# Patient Record
Sex: MALE | Race: WHITE
[De-identification: names, ages, dates, MRNs, and addresses within clinical notes are randomized per-mention and may not be internally consistent; named-entity substitution may affect disease eponyms.]

---

## 2019-02-15 ENCOUNTER — HOSPITAL ENCOUNTER (OUTPATIENT)
Dept: HOSPITAL 11 - JP.SDS | Age: 64
Discharge: HOME | End: 2019-02-15
Attending: SURGERY
Payer: MEDICAID

## 2019-02-15 DIAGNOSIS — K64.9: ICD-10-CM

## 2019-02-15 DIAGNOSIS — F17.210: ICD-10-CM

## 2019-02-15 DIAGNOSIS — K29.71: Primary | ICD-10-CM

## 2019-02-15 DIAGNOSIS — K63.89: ICD-10-CM

## 2019-02-15 DIAGNOSIS — K57.31: ICD-10-CM

## 2019-02-15 DIAGNOSIS — K21.0: ICD-10-CM

## 2019-02-15 DIAGNOSIS — R19.4: ICD-10-CM

## 2019-02-15 PROCEDURE — 87081 CULTURE SCREEN ONLY: CPT

## 2019-02-15 PROCEDURE — 43239 EGD BIOPSY SINGLE/MULTIPLE: CPT

## 2019-02-15 PROCEDURE — 45378 DIAGNOSTIC COLONOSCOPY: CPT

## 2019-02-15 PROCEDURE — 88305 TISSUE EXAM BY PATHOLOGIST: CPT

## 2019-02-15 NOTE — OR
DATE OF PROCEDURE:  02/15/2019

 

PREOPERATIVE DIAGNOSES:

1. Abdominal pain.

2. Change in bowel habits.

3. Blood in stool.

 

POSTOPERATIVE DIAGNOSES:

1. Abdominal pain.

2. Change in bowel habits.

3. Blood in stool.

4. Gastroesophageal reflux disease, mild gastritis.

5. Diverticulosis.

6. Melanosis coli.

7. Hemorrhoids.

 

PROCEDURE:

1. Esophagogastroduodenoscopy with antral biopsies for CLOtest, sent for 
pathology to look

    for Helicobacter pylori, biopsy of gastroesophageal junction.

2. Colonoscopy to the cecum.



SURGEON:  Dustin Rojas MD



ANESTHESIA:  IV anesthesia with monitored anesthesia care.

 

INDICATION:  This is a 63-year-old white male who is referred for upper and 
lower endoscopies.

Indications for these include abdominal pain, blood in stool, and change in 
bowel habits.  I

counseled him for upper and lower endoscopy with possible biopsy including 
risks and

alternatives, and he gave his informed consent to proceed.

 

DESCRIPTION OF PROCEDURE:  The patient was placed in the left lateral decubitus 
position.

IV anesthesia was administered by the Anesthesia Service.  Time-out was held.  
The flexible

video Olympus upper endoscope was passed through his mouth, down his esophagus 
and into 

his stomach.  The scope was easily passed through the pylorus into the duodenum 
reaching 

its third portion.  The scope was then slowly withdrawn examining the mucosa 
throughout.  The

duodenal mucosa appeared unremarkable.  The scope was brought back through the 
pylorus 

into the antrum.  There was a fine erythema here suggestive of mild gastritis.  
The scope was

retroflexed.  The proximal stomach appeared unremarkable.  The scope was 
straightened.  

We obtained antral biopsies for CLOtest and for pathology to look for 
Helicobacter pylori.  

The scope was then brought up through the GE junction.  This was abnormal in 
that the Z-line 

was not straight with fingers of gastric mucosa going proximally up into the 
esophagus.  We

obtained multiple totalling at least 6 biopsies of the gastroesophageal 
junction.  The scope

was then brought proximally up through the remainder of the esophagus which 
otherwise

appeared unremarkable and it was removed.

 

Next, a rectal exam was performed, which was unremarkable.  The flexible video 
Olympus

colonoscope was introduced through his anus, up his rectum, and out his colon 
all the way 

to the cecum.  En route, we saw multiple left-sided diverticula. There was no 
bleeding or 

inflammation associated with any of them.  Once the cecum was reached, the 
scope was 

slowly withdrawn examining the mucosa throughout.  No additional mucosal 
abnormalities 

were noted.  The scope was retroflexed in the rectum with the distal rectum 
showing some 

fairly prominent hemorrhoids, which is probably etiology of his blood in stool.
  The scope 

was straightened and removed.  He tolerated the procedure well.

 

 

 

 

Dustin Rojas MD

DD:  02/15/2019 10:25:59

DT:  02/15/2019 11:08:17

Job #:  [id number]

MTDD

## 2020-06-07 NOTE — EDM.PDOC
ED HPI GENERAL MEDICAL PROBLEM





- General


Chief Complaint: Abdominal Pain


Stated Complaint: REACTION TO MEDS FROM SURGERY


Time Seen by Provider: 06/07/20 10:53


Source of Information: Reports: Patient


History Limitations: Reports: No Limitations





- History of Present Illness


INITIAL COMMENTS - FREE TEXT/NARRATIVE: 





63 yo male presents with ABD pain, bloating, nausea and vomiting.  4 days ago 

pt had EGD, he had a normal bowel movement since then he has had increasing ABD 

pain with bloating and belching.  He has not been able to tolerate oral intake 

for 48 hours.  ABD pain is cramping in nature.  He denies hx of ABD surgery or 

past bowel obstructions


  ** abdomen and kindney pain


Pain Score (Numeric/FACES): 10





- Related Data


 Allergies











Allergy/AdvReac Type Severity Reaction Status Date / Time


 


No Known Allergies Allergy   Verified 06/07/20 10:54











Home Meds: 


 Home Meds





Aspirin [Adult Aspirin] 81 mg PO DAILY 02/14/19 [History]


Multivitamin [Multi-Vitamin Daily] 1 tab PO DAILY 02/14/19 [History]


Saw Palmetto 160 mg PO DAILY 06/04/20 [History]











Past Medical History


HEENT History: Reports: Cataract, Retinal Detachment, Other (See Below)


Other HEENT History: partial detached retina, ringing in ears


Gastrointestinal History: Reports: Chronic Constipation, Colon Polyp, 

Hemorrhoids, Other (See Below)


Other Gastrointestinal History: hx of Barretts


Musculoskeletal History: Reports: Arthritis, Back Pain, Chronic, Fracture, 

Other (See Below)


Other Musculoskeletal History: right foot fractured


Neurological History: Reports: Concussion





- Infectious Disease History


Infectious Disease History: Reports: Chicken Pox, Measles





- Past Surgical History


HEENT Surgical History: Reports: Cataract Surgery


GI Surgical History: Reports: Colonoscopy, EGD


Neurological Surgical History: Reports: None


Musculoskeletal Surgical History: Reports: Other (See Below)


Other Musculoskeletal Surgeries/Procedures:: right foot surgery





Social & Family History





- Tobacco Use


Smoking Status *Q: Current Every Day Smoker


Years of Tobacco use: 35


Packs/Tins Daily: 0.2





- Caffeine Use


Caffeine Use: Reports: Coffee





ED ROS GENERAL





- Review of Systems


Review Of Systems: See Below


Constitutional: Denies: Fever, Chills, Fatigue


Respiratory: Denies: Shortness of Breath, Wheezing


Cardiovascular: Denies: Chest Pain


GI/Abdominal: Reports: Abdominal Pain


: Denies: Dysuria





ED EXAM, GI/ABD





- Physical Exam


Exam: See Below


Exam Limited By: No Limitations


General Appearance: Alert, WD/WN, Moderate Distress


Head: Atraumatic, Normocephalic


Neck: Normal Inspection, Supple, Non-Tender, Full Range of Motion.  No: 

Lymphadenopathy (R), Lymphadenopathy (L)


Respiratory/Chest: No Respiratory Distress, Lungs Clear, Normal Breath Sounds.  

No: Crackles, Rhonchi, Wheezing


Cardiovascular: Tachycardia


GI/Abdominal Exam: Distended, Tender, Abnormal Bowel Sounds


Neurological: Alert, Oriented


Psychiatric: Normal Affect, Normal Mood


Skin Exam: Warm, Dry, Intact





Course





- Vital Signs


Last Recorded V/S: 


 Last Vital Signs











Temp  36.7 C   06/07/20 10:58


 


Pulse  103 H  06/07/20 10:58


 


Resp  16   06/07/20 10:58


 


BP  177/103 H  06/07/20 10:58


 


Pulse Ox  95   06/07/20 10:58














- Orders/Labs/Meds


Orders: 


 Active Orders 24 hr











 Category Date Time Status


 


 Sodium Chloride 0.9% [Normal Saline] 1,000 ml Med  06/07/20 11:15 Active





 IV ASDIRECTED   


 


 Sodium Chloride 0.9% [Normal Saline] 1,000 ml Med  06/07/20 14:00 Active





 IV ASDIRECTED   








 Medication Orders





Sodium Chloride (Normal Saline)  1,000 mls @ 500 mls/hr IV ASDIRECTED RUBÉN


   Last Admin: 06/07/20 11:24  Dose: 500 mls/hr


Sodium Chloride (Normal Saline)  1,000 mls @ 999 mls/hr IV ASDIRECTED RUBÉN








Labs: 


 Laboratory Tests











  06/07/20 06/07/20 06/07/20 Range/Units





  11:21 11:21 12:48 


 


WBC   23.3 H   (4.5-11.0)  K/uL


 


RBC   5.34   (4.30-5.90)  M/uL


 


Hgb   15.9 H   (12.0-15.0)  g/dL


 


Hct   47.5   (40.0-54.0)  %


 


MCV   89   (80-98)  fL


 


MCH   30   (27-31)  pg


 


MCHC   34   (32-36)  %


 


Plt Count   279   (150-400)  K/uL


 


Neut % (Auto)   84 H   (36-66)  %


 


Lymph % (Auto)   7 L   (24-44)  %


 


Mono % (Auto)   9 H   (2-6)  %


 


Eos % (Auto)   0 L   (2-4)  %


 


Baso % (Auto)   0   (0-1)  %


 


Sodium  133 L    (140-148)  mmol/L


 


Potassium  4.0    (3.6-5.2)  mmol/L


 


Chloride  98 L    (100-108)  mmol/L


 


Carbon Dioxide  23    (21-32)  mmol/L


 


Anion Gap  16.0 H    (5.0-14.0)  mmol/L


 


BUN  57 H    (7-18)  mg/dL


 


Creatinine  3.4 H    (0.8-1.3)  mg/dL


 


Est Cr Clr Drug Dosing  22.24    mL/min


 


Estimated GFR (MDRD)  18 L    (>60)  


 


Glucose  155 H    ()  mg/dL


 


Calcium  9.2    (8.5-10.1)  mg/dL


 


Total Bilirubin  0.9    (0.2-1.0)  mg/dL


 


AST  37    (15-37)  U/L


 


ALT  31    (12-78)  U/L


 


Alkaline Phosphatase  93    ()  U/L


 


Total Protein  8.2    (6.4-8.2)  g/dL


 


Albumin  3.9    (3.4-5.0)  g/dL


 


Globulin  4.3 H    (2.3-3.5)  g/dL


 


Albumin/Globulin Ratio  0.9 L    (1.2-2.2)  


 


Urine Color    Yellow  (YELLOW)  


 


Urine Appearance    Slightly cloudy A  (CLEAR)  


 


Urine pH    5.5  (5.0-8.0)  


 


Ur Specific Gravity    1.025  (1.008-1.030)  


 


Urine Protein    Negative  (NEGATIVE)  mg/dL


 


Urine Glucose (UA)    Negative  (NEGATIVE)  mg/dL


 


Urine Ketones    Negative  (NEGATIVE)  mg/dL


 


Urine Occult Blood    Large H  (NEGATIVE)  


 


Urine Nitrite    Negative  (NEGATIVE)  


 


Urine Bilirubin    Negative  (NEGATIVE)  


 


Urine Urobilinogen    0.2  (0.2-1.0)  EU/dL


 


Ur Leukocyte Esterase    Negative  (NEGATIVE)  


 


Urine RBC    20-30 H  (0-5)  


 


Urine WBC    0-5  (0-5)  


 


Ur Epithelial Cells    Few  


 


Amorphous Sediment    Not seen  


 


Urine Bacteria    Few  


 


Urine Mucus    Not seen  











Meds: 


Medications











Generic Name Dose Route Start Last Admin





  Trade Name Freq  PRN Reason Stop Dose Admin


 


Sodium Chloride  1,000 mls @ 500 mls/hr  06/07/20 11:15  06/07/20 11:24





  Normal Saline  IV   500 mls/hr





  ASDIRECTED RUBÉN   Administration





     





     





     





     


 


Sodium Chloride  1,000 mls @ 999 mls/hr  06/07/20 14:00  





  Normal Saline  IV   





  ASDIRECTED RUBÉN   





     





     





     





     














Discontinued Medications














Generic Name Dose Route Start Last Admin





  Trade Name Stephanie  PRN Reason Stop Dose Admin


 


Fentanyl  50 mcg  06/07/20 11:50  06/07/20 11:58





  Sublimaze  IVPUSH  06/07/20 11:51  50 mcg





  ONETIME ONE   Administration





     





     





     





     


 


Metoclopramide HCl  5 mg  06/07/20 11:11  06/07/20 11:23





  Reglan  IVPUSH  06/07/20 11:12  5 mg





  ONETIME ONE   Administration





     





     





     





     














- Radiology Interpretation


Free Text/Narrative:: 





abd xray no abnormalities


ct abd severe bladder distention may be associated with outlet obstruction.  

johnston place with greater then 1 liter out put.  pt greatly relieved of pain.  

He has no hx of CKD.  He does have past issues with BPH.  





- Re-Assessments/Exams


Free Text/Narrative Re-Assessment/Exam: 





06/07/20 15:02


after 2 liters of flood and cath placement resolution of symptoms.  pt will go 

home with johnston cath in place and follow-up with primary care in 2 days for 

cath removal and recheck of his kidney functions





Departure





- Departure


Time of Disposition: 15:04


Disposition: Home, Self-Care 01


Condition: Good


Clinical Impression: 


 Bladder outflow obstruction, Urinary retention, Acute kidney injury








- Discharge Information


*PRESCRIPTION DRUG MONITORING PROGRAM REVIEWED*: Not Applicable


*COPY OF PRESCRIPTION DRUG MONITORING REPORT IN PATIENT [facility]: Not Applicable


Instructions:  Acute Urinary Retention, Male, Easy-to-Read


Referrals: 


Enrike Ingram NP [Primary Care Provider] - 


Forms:  ED Department Discharge


Additional Instructions: 


leave johnston in place until follow-up in 2 days


fluid intake at 1.5-2 liters per day


your kidney function was impaired I believe as a result of the bladder output 

obstruction place this needs to be re-evaluated at your primary care visit on 

Tuesday.





Sepsis Event Note





- Evaluation


Sepsis Screening Result: No Definite Risk





- Focused Exam


Vital Signs: 


 Vital Signs











  Temp Pulse Resp BP Pulse Ox


 


 06/07/20 10:58  36.7 C  103 H  16  177/103 H  95


 


 06/07/20 10:48  36.7 C  103 H  16  177/103 H  95











Date Exam was Performed: 06/07/20


Time Exam was Performed: 14:56





- My Orders


Last 24 Hours: 


My Active Orders





06/07/20 11:15


Sodium Chloride 0.9% [Normal Saline] 1,000 ml IV ASDIRECTED 





06/07/20 14:00


Sodium Chloride 0.9% [Normal Saline] 1,000 ml IV ASDIRECTED 














- Assessment/Plan


Last 24 Hours: 


My Active Orders





06/07/20 11:15


Sodium Chloride 0.9% [Normal Saline] 1,000 ml IV ASDIRECTED 





06/07/20 14:00


Sodium Chloride 0.9% [Normal Saline] 1,000 ml IV ASDIRECTED

## 2020-06-07 NOTE — CRLCT
INDICATION:



Pain.



TECHNIQUE:



Noncontrast CT scan of the abdomen and pelvis.



FINDINGS:



The lung bases are unremarkable. No focal abnormalities identified in the 

visualized portions of the liver, spleen, pancreas, and adrenal glands.



Bilateral perinephric edema. No hydronephrosis. No uroliths. Distended 

urinary bladder. Enlarged prostate gland.



Colonic diverticulosis with no evidence of diverticulitis. The remainder of 

the GI tract is incompletely distended but shows no gross abnormalities. 

The stomach and GE junction are not well assessed.



No retroperitoneal, pelvic sidewall, or mesenteric adenopathy. 

Atherosclerotic vascular calcifications. Degenerative changes of the spine.



IMPRESSION:



1. Distended urinary bladder and bilateral perinephric edema may be due to 

a bladder outlet obstruction.



Dictated by Shady Conde MD @ 6/7/2020 12:49:47 PM



Dictated by: Shady Conde MD @ 06/07/2020 12:50:24



(Electronically Signed)

## 2020-06-07 NOTE — CRLCR
HISTORY:



Abdominal pain.



TECHNIQUE:



Supine and upright frontal views of the abdomen and pelvis.



COMPARISON:



None.



FINDINGS:



Gas in nondilated small bowel and colon. No free intraperitoneal gas. No 

pathologic calcifications. Degenerative changes of the spine.



IMPRESSION:



No acute findings.



Dictated by Saroj Wayne MD @ Jun 7 2020  1:07PM



Signed by Dr. Saroj Wayne @ Jun 7 2020  1:08PM

## 2020-06-14 NOTE — OR
DATE OF PROCEDURE:  06/04/2020

 

SURGEON:  Rene Goode MD

 

PREOPERATIVE DIAGNOSIS:  History of Ge's esophagus.

 

POSTOPERATIVE DIAGNOSES:

1. History of Ge's esophagus associated with a small hiatal hernia.

2. Mild diffuse gastritis.

 

OPERATIVE PROCEDURE:  Esophagogastroduodenoscopy with:

1. Biopsies of esophagogastric junction for histologic evaluation.

2. Biopsies of antrum for CLOtest.

 

ANESTHESIA:  IV sedation.

 

INDICATION FOR PROCEDURE:  This is a 64-year-old male presenting for followup 
upper GI

endoscopy for evaluation of his Ge's esophagus.  Plan is to proceed with 
upper GI

endoscopy with biopsies as indicated.  Potential risks including bleeding and 
perforation

were discussed, and the patient wishes to proceed.

 

DETAILS OF PROCEDURE:  The patient was taken to the operating room, placed in a 
left lateral

decubitus position.  IV sedation was administered after which the upper GI 
endoscope was

passed orally through the length of esophagus into stomach with retroflexion 
view of the

fundus and thereafter through the pyloric channel and into the junction of the 
third and

fourth portions of the duodenum.

 

Findings included normal hypopharynx, larynx, upper esophageal sphincter, and 
esophageal

body.  At the EG junction, there was a small roughly 1 cm hiatal hernia with 
some upward

extension of the gastroesophageal junction mucosal line above the upper gastric 
folds

consistent with history of Ge's esophagus.  There was no plaquing or 
stricturing and no 

gross evidence of neoplastic transition.  Within the stomach, there was some 
mild

diffuse redness and slight edema.  Otherwise, there were no erosions or ulcers 
seen.

Pyloric channel and visualized portions of the duodenum were unremarkable.

 

At this point, biopsies were taken from the antrum and sent for CLOtest for H 
pylori, and

following that, multiple biopsies were obtained from esophagogastric junction 
to evaluate

the status of the Ge's esophagus.  Procedure was then concluded.  There 
were no evident

complications.

 

Assuming that there is not a transition towards more dysplasia within the 
Ge's

esophagus, the next upper endoscopy would be in 2 years.

 

 

 

 

Rene Goode MD

DD:  06/13/2020 12:32:15

DT:  06/14/2020 12:03:15

Job #:  [id number]

MTDD

## 2020-07-03 NOTE — CRLCR
Indication:



Constipation



Technique:



Upright frontal view of the abdomen 



Comparison:



None



Findings/Impression: :



There are several air-fluid levels scattered throughout the abdomen. There 

does not appear to be a significant amount of feces. No free air or 

pneumatosis. Consider CT abdomen pelvis for further evaluation. 



Mild levoscoliosis of the lower thoracic spine.



Dictated by Jami Rachel MD @ Jul  3 2020  7:11PM



Signed by Dr. Jami Rachel @ Jul  3 2020  7:11PM

## 2020-07-03 NOTE — CRLCT
Abdominal distention and pain. 



Noncontrast CT abdomen and pelvis coronal sagittal reformat images 

obtained.



COMPARISON:



CT abdomen and pelvis 07/07/2020.



Findings: 



Heart size is normal. No pericardial effusion. Lung bases are clear. 

Unenhanced liver spleen pancreas adrenal glands are unremarkable 

gallbladder is unremarkable. No abdominal aortic aneurysm.



Kidneys are unremarkable. No hydronephrosis. Normal appendix. 

Diverticulosis. No bowel obstruction. Enlargement of the prostate gland 

which protrudes into the bladder base. There is a Morales within the urinary 

bladder. Bladder is not completely distended. There is diffuse bladder wall 

thickening. No suspicious bony lesions. 



Impression: 



1. No acute findings in the abdomen or pelvis. 



1. Marked enlargement of the prostate gland appeared diffuse bladder wall 

thickening which is not completely distended. Findings may represent 

bladder outlet obstruction. 



2. Diverticulosis



Please note that all CT scans at this facility use dose modulation, 

iterative reconstruction, and/or weight-based dosing when appropriate to 

reduce radiation dose to as low as reasonably achievable.



Dictated by Usha Conde MD @ Jul  3 2020  7:47PM



Signed by Dr. Usha Conde @ Jul  3 2020  7:53PM

## 2020-07-03 NOTE — EDM.PDOC
ED HPI GENERAL MEDICAL PROBLEM





- General


Chief Complaint: Genitourinary Problem


Stated Complaint: CATH PROBLEMS


Time Seen by Provider: 07/03/20 02:20


Source of Information: Reports: Patient


History Limitations: Reports: No Limitations





- History of Present Illness


INITIAL COMMENTS - FREE TEXT/NARRATIVE: 





Patient presents with questions not about his urinary catheter but about con

stipation.  He has had constipation for some time and tonight was concerned 

about whether or not he had a blockage in his intestines.  He contacted an 

unspecified nurse advice line who recommended that he be seen within the next 

couple hours because of his situation.  Review of records from the [facility] show that he actually was seen earlier today for constipation and they 

discussed a number of options going forward.  He has tried a couple of enemas at

home but they have been small volume prepackaged things and he did not notice 

much difference.  He wonders if there is something different we would do here 

that would help him.  Constipation is been a long-term problem for him.


Onset: Gradual


Duration: Chronic


Location: Reports: Abdomen


Severity: Mild


Improves with: Reports: None


Worsens with: Reports: None


  ** Lower Abdomen


Pain Score (Numeric/FACES): 1





- Related Data


                                    Allergies











Allergy/AdvReac Type Severity Reaction Status Date / Time


 


No Known Allergies Allergy   Verified 07/03/20 02:06











Home Meds: 


                                    Home Meds





Aspirin [Adult Aspirin] 81 mg PO DAILY 02/14/19 [History]


Multivitamin [Multi-Vitamin Daily] 1 tab PO DAILY 02/14/19 [History]


Saw Palmetto 160 mg PO DAILY 06/04/20 [History]


Tamsulosin [Tamsulosin 24 Hr] 0.4 mg PO DAILY 07/03/20 [History]











Past Medical History


HEENT History: Reports: Cataract, Retinal Detachment, Other (See Below)


Other HEENT History: partial detached retina, ringing in ears


Gastrointestinal History: Reports: Chronic Constipation, Colon Polyp, 

Hemorrhoids, Other (See Below)


Other Gastrointestinal History: hx of Barretts


Genitourinary History: Reports: Other (See Below)


Other Genitourinary History: Morales catheter for 1 month


Musculoskeletal History: Reports: Arthritis, Back Pain, Chronic, Fracture, Other

 (See Below)


Other Musculoskeletal History: right foot fractured


Neurological History: Reports: Concussion





- Infectious Disease History


Infectious Disease History: Reports: Chicken Pox





- Past Surgical History


HEENT Surgical History: Reports: Cataract Surgery


GI Surgical History: Reports: Colonoscopy, EGD


Musculoskeletal Surgical History: Reports: Other (See Below)


Other Musculoskeletal Surgeries/Procedures:: right foot surgery





Social & Family History





- Tobacco Use


Smoking Status *Q: Current Every Day Smoker


Years of Tobacco use: 40


Packs/Tins Daily: 0.5


Used Tobacco, but Quit: No


Second Hand Smoke Exposure: Yes





- Caffeine Use


Caffeine Use: Reports: Coffee





- Recreational Drug Use


Recreational Drug Use: No





ED ROS GENERAL





- Review of Systems


Review Of Systems: Comprehensive ROS is negative, except as noted in HPI.





ED EXAM, RENAL/





- Physical Exam


Exam: See Below


Exam Limited By: No Limitations


General Appearance: Alert, No Apparent Distress


Respiratory/Chest: No Respiratory Distress


Cardiovascular: Regular Rate, Rhythm


GI/Abdominal: Soft, Non-Tender





Course





- Vital Signs


Last Recorded V/S: 


                                Last Vital Signs











Temp  36.6 C   07/03/20 02:18


 


Pulse  86   07/03/20 02:18


 


Resp  16   07/03/20 02:18


 


BP  150/96 H  07/03/20 02:18


 


Pulse Ox  97   07/03/20 02:18














- Re-Assessments/Exams


Free Text/Narrative Re-Assessment/Exam: 





07/03/20 03:11


I discussed options for approaching his constipation.  We discussed enemas and 

he does not seem interested in that as he believes that he does not have much 

stool in the descending colon at this time.  He tried enema fluid at home along 

with a number of different positioning maneuvers to help its distribution.  He 

has MiraLAX but is using a small scoop in water every day.  He is tried mag 

citrate with minor results.  I recommend the MiraLAX colonoscopy cleanout using 

2 L of fluid and dividing the entire 238 g container of MiraLAX between the 2 

bottles.  He should drink 1, weight 2 hours, drink the other and wait for result

s.  I discussed that even once he is completely cleared out, his intestines need

 a month to reestablish peristalsis and that would require ensuring adequate 

food fiber.  We also discussed briefly Power Pudding as a source of food fiber. 

 He did not request discharge documents but walked out of the department with 

questions answered.





Departure





- Departure


Time of Disposition: 02:48


Disposition: Home, Self-Care 01


Clinical Impression: 


Constipation


Qualifiers:


 Constipation type: unspecified constipation type Qualified Code(s): K59.00 - 

Constipation, unspecified








- Discharge Information


Referrals: 


Enrike Ingram NP [Primary Care Provider] - 


Forms:  ED Department Discharge


Additional Instructions: 


Use the MiraLAX colonoscopy prep incorporating 2 L bottles of Gatorade or 

similar clear fluid and half of a 238 g container of MiraLAX powder.  Drink the 

first bottle over 1 hour wait 2 hours and then drink the second bottle.  You 

could look for power pudding recipes that would give you food fiber that would 

be tasty and keep you from getting constipated again.  Enemas can also help hard

stool.  Ultimately, after a cleanout it takes 1 month to retrain the intestines 

to work the way they should.





Sepsis Event Note (ED)





- Evaluation


Sepsis Screening Result: No Definite Risk





- Focused Exam


Vital Signs: 


                                   Vital Signs











  Temp Pulse Resp BP Pulse Ox


 


 07/03/20 02:18  36.6 C  86  16  150/96 H  97


 


 07/03/20 01:57  36.6 C  86  16  150/96 H  97